# Patient Record
Sex: FEMALE | Race: WHITE | Employment: STUDENT | URBAN - NONMETROPOLITAN AREA
[De-identification: names, ages, dates, MRNs, and addresses within clinical notes are randomized per-mention and may not be internally consistent; named-entity substitution may affect disease eponyms.]

---
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
## 2023-03-29 ENCOUNTER — HOSPITAL ENCOUNTER (INPATIENT)
Age: 18
LOS: 3 days | Discharge: HOME | End: 2023-04-01
Attending: EMERGENCY MEDICINE | Admitting: PSYCHIATRY & NEUROLOGY
Payer: COMMERCIAL

## 2023-03-29 DIAGNOSIS — S50.812A ABRASION OF LEFT FOREARM, INITIAL ENCOUNTER: ICD-10-CM

## 2023-03-29 DIAGNOSIS — R45.851 DEPRESSION WITH SUICIDAL IDEATION: Primary | ICD-10-CM

## 2023-03-29 DIAGNOSIS — F32.A DEPRESSION WITH SUICIDAL IDEATION: Primary | ICD-10-CM

## 2023-03-29 DIAGNOSIS — T14.91XA SUICIDAL BEHAVIOR WITH ATTEMPTED SELF-INJURY (HCC): ICD-10-CM

## 2023-03-29 PROBLEM — F33.0 MDD (MAJOR DEPRESSIVE DISORDER), RECURRENT EPISODE, MILD (HCC): Status: ACTIVE | Noted: 2023-03-29

## 2023-03-29 LAB
ALBUMIN SERPL BCG-MCNC: 4.2 G/DL (ref 3.5–5.1)
ALP SERPL-CCNC: 71 U/L (ref 38–126)
ALT SERPL W/O P-5'-P-CCNC: 18 U/L (ref 11–66)
AMPHETAMINES UR QL SCN: NEGATIVE
ANION GAP SERPL CALC-SCNC: 13 MEQ/L (ref 8–16)
AST SERPL-CCNC: 19 U/L (ref 5–40)
B-HCG SERPL QL: NEGATIVE
BARBITURATES UR QL SCN: NEGATIVE
BASOPHILS ABSOLUTE: 0 THOU/MM3 (ref 0–0.1)
BASOPHILS NFR BLD AUTO: 0.3 %
BENZODIAZ UR QL SCN: NEGATIVE
BILIRUB SERPL-MCNC: < 0.2 MG/DL (ref 0.3–1.2)
BILIRUB UR QL STRIP.AUTO: NEGATIVE
BUN SERPL-MCNC: 9 MG/DL (ref 7–22)
BZE UR QL SCN: NEGATIVE
CALCIUM SERPL-MCNC: 9.5 MG/DL (ref 8.5–10.5)
CANNABINOIDS UR QL SCN: NEGATIVE
CHARACTER UR: CLEAR
CHLORIDE SERPL-SCNC: 103 MEQ/L (ref 98–111)
CO2 SERPL-SCNC: 22 MEQ/L (ref 23–33)
COLOR: YELLOW
CREAT SERPL-MCNC: 0.8 MG/DL (ref 0.4–1.2)
DEPRECATED RDW RBC AUTO: 40.4 FL (ref 35–45)
EOSINOPHIL NFR BLD AUTO: 0.4 %
EOSINOPHILS ABSOLUTE: 0 THOU/MM3 (ref 0–0.4)
ERYTHROCYTE [DISTWIDTH] IN BLOOD BY AUTOMATED COUNT: 12.9 % (ref 11.5–14.5)
FENTANYL: NEGATIVE
GFR SERPL CREATININE-BSD FRML MDRD: > 60 ML/MIN/1.73M2
GLUCOSE SERPL-MCNC: 89 MG/DL (ref 70–108)
GLUCOSE UR QL STRIP.AUTO: NEGATIVE MG/DL
HCT VFR BLD AUTO: 41.4 % (ref 37–47)
HGB BLD-MCNC: 13.2 GM/DL (ref 12–16)
HGB UR QL STRIP.AUTO: NEGATIVE
IMM GRANULOCYTES # BLD AUTO: 0.04 THOU/MM3 (ref 0–0.07)
IMM GRANULOCYTES NFR BLD AUTO: 0.3 %
KETONES UR QL STRIP.AUTO: NEGATIVE
LYMPHOCYTES ABSOLUTE: 2.5 THOU/MM3 (ref 1–4.8)
LYMPHOCYTES NFR BLD AUTO: 20.5 %
MCH RBC QN AUTO: 27.6 PG (ref 26–33)
MCHC RBC AUTO-ENTMCNC: 31.9 GM/DL (ref 32.2–35.5)
MCV RBC AUTO: 86.4 FL (ref 81–99)
MONOCYTES ABSOLUTE: 0.7 THOU/MM3 (ref 0.4–1.3)
MONOCYTES NFR BLD AUTO: 6 %
NEUTROPHILS NFR BLD AUTO: 72.5 %
NITRITE UR QL STRIP: NEGATIVE
NRBC BLD AUTO-RTO: 0 /100 WBC
OPIATES UR QL SCN: NEGATIVE
OSMOLALITY SERPL CALC.SUM OF ELEC: 273.8 MOSMOL/KG (ref 275–300)
OXYCODONE: NEGATIVE
PCP UR QL SCN: NEGATIVE
PH UR STRIP.AUTO: 6.5 [PH] (ref 5–9)
PLATELET # BLD AUTO: 360 THOU/MM3 (ref 130–400)
PMV BLD AUTO: 9.7 FL (ref 9.4–12.4)
POTASSIUM SERPL-SCNC: 4 MEQ/L (ref 3.5–5.2)
PROT SERPL-MCNC: 8.2 G/DL (ref 6.1–8)
PROT UR STRIP.AUTO-MCNC: NEGATIVE MG/DL
RBC # BLD AUTO: 4.79 MILL/MM3 (ref 4.2–5.4)
SEGMENTED NEUTROPHILS ABSOLUTE COUNT: 9 THOU/MM3 (ref 1.8–7.7)
SODIUM SERPL-SCNC: 138 MEQ/L (ref 135–145)
SP GR UR REFRACT.AUTO: 1.02 (ref 1–1.03)
UROBILINOGEN, URINE: 0.2 EU/DL (ref 0–1)
WBC # BLD AUTO: 12.4 THOU/MM3 (ref 4.8–10.8)
WBC #/AREA URNS HPF: NEGATIVE /[HPF]

## 2023-03-29 PROCEDURE — 90471 IMMUNIZATION ADMIN: CPT | Performed by: EMERGENCY MEDICINE

## 2023-03-29 PROCEDURE — 99285 EMERGENCY DEPT VISIT HI MDM: CPT

## 2023-03-29 PROCEDURE — 36415 COLL VENOUS BLD VENIPUNCTURE: CPT

## 2023-03-29 PROCEDURE — 81003 URINALYSIS AUTO W/O SCOPE: CPT

## 2023-03-29 PROCEDURE — 85025 COMPLETE CBC W/AUTO DIFF WBC: CPT

## 2023-03-29 PROCEDURE — 80307 DRUG TEST PRSMV CHEM ANLYZR: CPT

## 2023-03-29 PROCEDURE — 1240000000 HC EMOTIONAL WELLNESS R&B

## 2023-03-29 PROCEDURE — 6370000000 HC RX 637 (ALT 250 FOR IP): Performed by: PSYCHIATRY & NEUROLOGY

## 2023-03-29 PROCEDURE — 84703 CHORIONIC GONADOTROPIN ASSAY: CPT

## 2023-03-29 PROCEDURE — 80053 COMPREHEN METABOLIC PANEL: CPT

## 2023-03-29 PROCEDURE — 6360000002 HC RX W HCPCS: Performed by: EMERGENCY MEDICINE

## 2023-03-29 PROCEDURE — 90715 TDAP VACCINE 7 YRS/> IM: CPT | Performed by: EMERGENCY MEDICINE

## 2023-03-29 RX ORDER — IBUPROFEN 400 MG/1
400 TABLET ORAL EVERY 6 HOURS PRN
Status: DISCONTINUED | OUTPATIENT
Start: 2023-03-29 | End: 2023-04-01 | Stop reason: HOSPADM

## 2023-03-29 RX ORDER — HYDROXYZINE HYDROCHLORIDE 25 MG/1
50 TABLET, FILM COATED ORAL 3 TIMES DAILY PRN
Status: DISCONTINUED | OUTPATIENT
Start: 2023-03-29 | End: 2023-04-01 | Stop reason: HOSPADM

## 2023-03-29 RX ORDER — TRAZODONE HYDROCHLORIDE 50 MG/1
50 TABLET ORAL NIGHTLY PRN
Status: DISCONTINUED | OUTPATIENT
Start: 2023-03-29 | End: 2023-04-01 | Stop reason: HOSPADM

## 2023-03-29 RX ORDER — HYDROXYZINE HYDROCHLORIDE 25 MG/1
50 TABLET, FILM COATED ORAL 3 TIMES DAILY PRN
Status: DISCONTINUED | OUTPATIENT
Start: 2023-03-29 | End: 2023-03-29 | Stop reason: CLARIF

## 2023-03-29 RX ORDER — TRAZODONE HYDROCHLORIDE 50 MG/1
50 TABLET ORAL NIGHTLY PRN
Status: DISCONTINUED | OUTPATIENT
Start: 2023-03-29 | End: 2023-03-29 | Stop reason: CLARIF

## 2023-03-29 RX ORDER — ACETAMINOPHEN 325 MG/1
650 TABLET ORAL EVERY 4 HOURS PRN
Status: DISCONTINUED | OUTPATIENT
Start: 2023-03-29 | End: 2023-04-01 | Stop reason: HOSPADM

## 2023-03-29 RX ORDER — IBUPROFEN 200 MG
400 TABLET ORAL EVERY 6 HOURS PRN
Status: DISCONTINUED | OUTPATIENT
Start: 2023-03-29 | End: 2023-03-29 | Stop reason: CLARIF

## 2023-03-29 RX ORDER — MAGNESIUM HYDROXIDE/ALUMINUM HYDROXICE/SIMETHICONE 120; 1200; 1200 MG/30ML; MG/30ML; MG/30ML
30 SUSPENSION ORAL EVERY 6 HOURS PRN
Status: DISCONTINUED | OUTPATIENT
Start: 2023-03-29 | End: 2023-03-29 | Stop reason: CLARIF

## 2023-03-29 RX ORDER — MAGNESIUM HYDROXIDE/ALUMINUM HYDROXICE/SIMETHICONE 120; 1200; 1200 MG/30ML; MG/30ML; MG/30ML
30 SUSPENSION ORAL EVERY 6 HOURS PRN
Status: DISCONTINUED | OUTPATIENT
Start: 2023-03-29 | End: 2023-04-01 | Stop reason: HOSPADM

## 2023-03-29 RX ORDER — ACETAMINOPHEN 325 MG/1
650 TABLET ORAL EVERY 4 HOURS PRN
Status: DISCONTINUED | OUTPATIENT
Start: 2023-03-29 | End: 2023-03-29 | Stop reason: CLARIF

## 2023-03-29 RX ADMIN — ACETAMINOPHEN 650 MG: 325 TABLET ORAL at 17:42

## 2023-03-29 RX ADMIN — TETANUS TOXOID, REDUCED DIPHTHERIA TOXOID AND ACELLULAR PERTUSSIS VACCINE, ADSORBED 0.5 ML: 5; 2.5; 8; 8; 2.5 SUSPENSION INTRAMUSCULAR at 15:31

## 2023-03-29 RX ADMIN — HYDROXYZINE HYDROCHLORIDE 50 MG: 25 TABLET, FILM COATED ORAL at 17:42

## 2023-03-29 ASSESSMENT — PAIN - FUNCTIONAL ASSESSMENT
PAIN_FUNCTIONAL_ASSESSMENT: NONE - DENIES PAIN
PAIN_FUNCTIONAL_ASSESSMENT: ACTIVITIES ARE NOT PREVENTED

## 2023-03-29 ASSESSMENT — SLEEP AND FATIGUE QUESTIONNAIRES
DO YOU USE A SLEEP AID: NO
DO YOU HAVE DIFFICULTY SLEEPING: NO
AVERAGE NUMBER OF SLEEP HOURS: 8

## 2023-03-29 ASSESSMENT — LIFESTYLE VARIABLES
HOW MANY STANDARD DRINKS CONTAINING ALCOHOL DO YOU HAVE ON A TYPICAL DAY: PATIENT DOES NOT DRINK
HOW OFTEN DO YOU HAVE A DRINK CONTAINING ALCOHOL: NEVER

## 2023-03-29 ASSESSMENT — PATIENT HEALTH QUESTIONNAIRE - PHQ9: SUM OF ALL RESPONSES TO PHQ QUESTIONS 1-9: 2

## 2023-03-29 ASSESSMENT — PAIN SCALES - GENERAL
PAINLEVEL_OUTOF10: 3
PAINLEVEL_OUTOF10: 3
PAINLEVEL_OUTOF10: 0

## 2023-03-29 ASSESSMENT — PAIN DESCRIPTION - LOCATION
LOCATION: HEAD
LOCATION: HEAD

## 2023-03-29 ASSESSMENT — PAIN DESCRIPTION - DESCRIPTORS: DESCRIPTORS: ACHING

## 2023-03-29 NOTE — ED TRIAGE NOTES
Pt presents to the ED with c/o suicidal thoughts. Pt reports since Monday she has been having these thoughts. Pt reports she she had thoughts like this before but about 4 years ago. Pt reports everything in her life has started to build up and is stressing her out. Patient placed in safe room that is ligature resistant with continuous monitoring in place. Provider notified, requested an assessment by behavioral health . Patient belongings secured in a locked lockers outside of the room. Explained suicide prevention precautions to the patient including constant observer. Urine sample collected at this time. Level A paged.

## 2023-03-29 NOTE — ED PROVIDER NOTES
blank): No orders to display       LABS: (none if blank)  Labs Reviewed   CBC WITH AUTO DIFFERENTIAL - Abnormal; Notable for the following components:       Result Value    WBC 12.4 (*)     MCHC 31.9 (*)     Segs Absolute 9.0 (*)     All other components within normal limits   COMPREHENSIVE METABOLIC PANEL W/ REFLEX TO MG FOR LOW K - Abnormal; Notable for the following components:    CO2 22 (*)     Total Protein 8.2 (*)     Total Bilirubin <0.2 (*)     All other components within normal limits   OSMOLALITY - Abnormal; Notable for the following components:    Osmolality Calc 273.8 (*)     All other components within normal limits   HCG, SERUM, QUALITATIVE   URINALYSIS WITH REFLEX TO CULTURE   URINE DRUG SCREEN   ANION GAP   GLOMERULAR FILTRATION RATE, ESTIMATED       (Any cultures that may have been sent were not resulted at the time of this patient visit)    [facility] / ED COURSE:     1) Number and Complexity of Problems            Problem List This Visit:         Chief Complaint   Patient presents with    Suicidal            Differential Diagnosis includes (but not limited to): Major depression with suicidal attempt and thoughts,        Diagnoses Considered but I have low suspicion of:                Pertinent Comorbid Conditions:        2)  Data Reviewed (none if left blank)          My Independent interpretations:       Imaging: none    Labs: Within normal limits                 Decision Rules/Clinical Scores utilized:  None            External Documentation Reviewed:         Previous patient encounter documents & history available on EMR was reviewed none             See Formal Diagnostic Results above for the lab and radiology tests and orders.     3)  Treatment and Disposition:         ED Medications administered this visit:  (None if blank)         Medications   Tetanus-Diphth-Acell Pertussis (BOOSTRIX) injection 0.5 mL (has no administration in time range)            ED Reassessment: patient. PROCEDURES: (None if blank)  Procedures:     CRITICAL CARE:  None      FINAL IMPRESSION      1. Depression with suicidal ideation    2. Abrasion of left forearm and shoulder, initial encounter    3. Suicidal behavior with attempted self-injury [facility])          DISPOSITION/PLAN   DISPOSITION Admitted 03/29/2023 12:52:58 PM      PATIENT REFERRED TO: Patient's admitted to psychiatry services Dr.Rusheeth Landis, *graciously admitted the patient        (Please note that portions of this note were completed with a voice recognition program and electronically transcribed. Efforts were made to edit the dictations but occasionally words are mis-transcribed . The transcription may contain errors not detected in proofreading.   This transcription was electronically signed.)     03/29/23 2:10 PM      Nicole Lynn MD          Emergency room physician             Nicole Lynn MD  03/29/23 1938

## 2023-03-29 NOTE — ED NOTES
ED to inpatient nurses report    Chief Complaint   Patient presents with    Suicidal      Present to ED from home  LOC: alert and orientated to name, place, date  Vital signs   Vitals:    03/29/23 1141   BP: (!) 141/78   Pulse: (!) 104   Resp: 18   Temp: 98 °F (36.7 °C)   SpO2: 98%      Oxygen Baseline RA    Current needs required RA Bipap/Cpap No  LDAs:    Mobility: Independent  Pending ED orders: none  Present condition: Patient resting in bed. Respirations easy and unlabored. No distress noted. Call light within reach.       C-SSRS Risk of Suicide: High Risk  Swallow Screening    Preferred Language: [place]     Electronically signed by Halle Diaz RN on 3/29/2023 at 3:28 PM     Elma Gilbert RN  03/29/23 9458

## 2023-03-29 NOTE — PROGRESS NOTES
Nicolasa Aguiar, [facility] staff accompanying pt reports that she has spent time with pt this morning and the pt is in a much better state currently than she had been this morning. Urbano Ramirez reports she is concerned as the pt has shared she feels safe when with friends or while busy however if she has down time and is alone she is not able to contract for safety and is unsure of whether she would harm herself. Level of Care Disposition:      Consulted with Dr. Iris Foster. Pt is medically cleared. Consulted with Dr. Hoa Pedraza. Pt to be admitted to 4E. Medical staff updated on POC. Pt updated on POC. Voluntary form signed and placed in safe room chart 25. Report given to [facility] on 4E.

## 2023-03-29 NOTE — Clinical Note
Discharge Plan[de-identified] Home with Office Follow-up   [facility] request [facility]

## 2023-03-29 NOTE — PROGRESS NOTES
Behavioral Health   Admission Note   Admission Type: Involuntary    Reason for Admission: \"suicidal thoughts\"    Patient Strengths/Barriers  Strengths (Must Choose Two): Spirituality, Support from friends, Recreational/leisure/hobbies  Barriers: Motivation level for treatment    Addictive Behavior  In the Past 3 Months, Have You Felt or Has Someone Told You That You Have a Problem With  : None    Medical Problems:   History reviewed. No pertinent past medical history. Status EXAM:  Mental Status and Behavioral Exam  Normal: Yes  Level of Assistance: Independent/Self  Facial Expression: Flat, Sad, Worried  Affect: Blunt  Level of Consciousness: Alert  Frequency of Checks: 4 times per hour, close  Mood:Normal: No  Mood: Anxious, Sad  Motor Activity:Normal: Yes  Eye Contact: Good  Observed Behavior: Cooperative, Friendly  Sexual Misconduct History: Current - no  Preception: Dexter to person, Dexter to time, Dexter to place, Dexter to situation  Attention:Normal: Yes  Thought Processes: Unremarkable  Thought Content:Normal: Yes  Depression Symptoms: Appetite change, Change in energy level  Anxiety Symptoms: Generalized  [place]ill[place] Symptoms: No problems reported or observed. Hallucinations: None  Delusions: No  Memory:Normal: Yes  Insight and Judgment: No  Insight and Judgment: Poor judgment, Poor insight    Pt admitted with followings belongings:  Dental Appliances: None  Vision - Corrective Lenses: None  Hearing Aid: None  Jewelry: Necklace  Body Piercings Removed: N/A  Clothing: Footwear, Jacket/Coat, Pants, Shirt, Socks, Sweater, Undergarments, Other (Comment) (Locker)  Other Valuables: Money, Wallet, Keys, Other (Comment) ($3.00 cash all in locker.)     Admission order obtained Yes  Belongings locked on unit. Valuables placed in locked in belongings. Patient's home medications were n/a. Patient oriented to surroundings and program expectations and copy of patient rights given. Received admission packet:   Yes Consents reviewed, signed Yes. Outcomes Questionnaire completed Yes. \"An Important Message from [employer] About Your Rights\" form reviewed, signed: N/A . Patient verbalize understanding: Yes. Patient informed of 15 minute safety monitoring: YES/NO/NA: yes          Patient screened positive for suicide risk on CSSR-S (\"yes\" to question #4, 5, OR 6)  yes. Physician notified of risk score yes  Constant Observer Orders received no . 2 person skin assessment completed upon admission declined. Patient has multiple self inflicted cuts on her left wrist and left shoulder. Explained patients right to have family, representative or physician notified of their admission. Patient has Declined for physician to be notified. Patient has Declined for family/representative to be notified. Provided pt with [employer] handout entitled \"Quitting Smoking. \"  Reviewed handout with pt addressing dangers of smoking, developing coping skills, and providing basic information about quitting. Pt response to counseling:  does not smoke    Admission summary:   Patient having increased depression and anxiety. Reports that she is very anxious d/t not doing well on 2 exams and finding out that her school tuition is increasing by $4,000 per semester. Patient cut herself on her left wrist and left shoulder.           Raman Walker RN

## 2023-03-30 PROBLEM — F32.A DEPRESSION WITH SUICIDAL IDEATION: Status: ACTIVE | Noted: 2023-03-29

## 2023-03-30 PROBLEM — R45.851 DEPRESSION WITH SUICIDAL IDEATION: Status: ACTIVE | Noted: 2023-03-29

## 2023-03-30 PROCEDURE — APPSS30 APP SPLIT SHARED TIME 16-30 MINUTES: Performed by: PHYSICIAN ASSISTANT

## 2023-03-30 PROCEDURE — 1240000000 HC EMOTIONAL WELLNESS R&B

## 2023-03-30 RX ORDER — SERTRALINE HYDROCHLORIDE 25 MG/1
25 TABLET, FILM COATED ORAL DAILY
Status: DISCONTINUED | OUTPATIENT
Start: 2023-03-30 | End: 2023-04-01 | Stop reason: HOSPADM

## 2023-03-30 RX ORDER — NORETHINDRONE ACETATE AND ETHINYL ESTRADIOL 1.5; .03 MG/1; MG/1
1 TABLET ORAL DAILY
COMMUNITY
Start: 2023-03-09

## 2023-03-30 RX ORDER — NORETHINDRONE ACETATE AND ETHINYL ESTRADIOL .03; 1.5 MG/1; MG/1
1 TABLET ORAL DAILY
Status: DISCONTINUED | OUTPATIENT
Start: 2023-03-30 | End: 2023-04-01 | Stop reason: HOSPADM

## 2023-03-30 RX ADMIN — NORETHINDRONE ACETATE AND ETHINYL ESTRADIOL 1 TABLET: .03; 1.5 TABLET ORAL at 08:38

## 2023-03-30 ASSESSMENT — PAIN SCALES - GENERAL
PAINLEVEL_OUTOF10: 0
PAINLEVEL_OUTOF10: 0

## 2023-03-30 NOTE — BH NOTE
INPATIENT RECREATIONAL THERAPY  ADULT BEHAVIORAL SERVICES  EVALUATION    REFERRING PHYSICIAN:    Dr. Liza Stark  DIAGNOSIS:    Major Depressive Disorder, Recurrent Episode, Mild  PRECAUTIONS:   Standard precautions    HISTORY OF PRESENT ILLNESS/INJURY:   Patient was admitted to the unit due to suicidal ideation and depression. Patient reported having thoughts of wrecking her car prior to admission. Patient has also been cutting her left wrist. Patient reported stress due to school and stated that she recently got some poor scores on her exams. Patient also reported that her tuition has gone up and she feels worried about her financial situation. Patient was cooperative and pleasant with a bright affect. 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 LIVING SITUATION/SUPPORT:   Lives on campus at [employer]. Family and friends are supportive. EDUCATIONAL LEVEL:  Patient is currently a college student. MEDICATION/DRUG USE:  None reported    LEISURE INTERESTS: drawing, coloring, arts/crafts, playing games and cards, activities with her friends, watching [employer], shot put in track and field, jogging, spiritual activities, listening to music, line dancing, cooking, walking, outdoor activities, family activities  ACTIVITY PREFERENCE:   no preference  ACTIVITY TYPES:   Passive. Indoor. Outdoor. Active.    COGNITION:   A&Ox4    COPING:   poor  ATTENTION:   fair  RELAXATION:  fair  SELF-ESTEEM:  poor  MOTIVATION:   poor    SOCIAL SKILLS:   good  FRUSTRATION TOLERANCE:  poor - cuts on left wrist.   ATTENTION SEEKING:  Patient has been cutting her left wrist.  COOPERATION: cooperative and pleasant  AFFECT:   bright  APPEARANCE:   appropriate    HEARING:   no problems noted  VISION:   no problems noted   VERBAL COMMUNICATION:   no problems  WRITTEN COMMUNICATION:  no problems       MOBILITY:   Ambulates independently   GOALS:  Identify 2 new positive coping skills by time of discharge.

## 2023-03-30 NOTE — BH NOTE
PLAN OF CARE:     Start Time: 0900  End Time:  0930    Group Topic:  Daily Goals    Group Type:   Goal Group    Intervention/Goal:  To increase support and identify daily goals    Attendance:  attended group      Affect:  bright    Behavior:  cooperative and pleasant    Response:  appropriate    Daily Goal:  \"Be positive. Think clearly. Create coping skills for stress management. \"    Progress:  progressing to goal

## 2023-03-30 NOTE — PLAN OF CARE
Problem: Self Harm/Suicidality  Goal: Will have no self-injury during hospital stay  Description: INTERVENTIONS:  1. Ensure constant observer at bedside with Q15M safety checks  2. Maintain a safe environment  3. Secure patient belongings  4. Ensure family/visitors adhere to safety recommendations  5. Ensure safety tray has been added to patient's diet order  6. Every shift and PRN: Re-assess suicidal risk via Frequent Screener    3/30/2023 1033 by Frankey Beat, LPN  Outcome: Progressing  Note: No self injury during hospital stay , will continue to monitor  3/29/2023 2216 by Maria Elena Monroy LPN  Outcome: Progressing  Note: No self injury noted during this shift      Problem: Pain  Goal: Verbalizes/displays adequate comfort level or baseline comfort level  3/30/2023 1033 by Frankey Beat, LPN  Outcome: Progressing  Note: Patient denies pain at this time, will continue to monitor  3/29/2023 2216 by Maria Elena Monroy LPN  Outcome: Progressing  Note: Patient denies pain     Problem: Risk for Elopement  Goal: Patient will not exit the unit/facility without proper excort  3/30/2023 1033 by Frankey Beat, LPN  Outcome: Progressing  Note: Patient did not attempt to exit the unit without proper excort  3/29/2023 2216 by Maria Elena Monroy LPN  Outcome: Progressing  Flowsheets  Taken 3/29/2023 2212 by Maria Elena Monroy LPN  Nursing Interventions for Elopement Risk: Make sure patient has all necessary personal care items  Taken 3/29/2023 1625 by Libia Swan RN  Nursing Interventions for Elopement Risk: Make sure patient has all necessary personal care items  Note: Patient did not try to exit the unit during this shift      Problem: Anxiety  Goal: Will report anxiety at manageable levels  Description: INTERVENTIONS:  1. Administer medication as ordered  2. Teach and rehearse alternative coping skills  3.  Provide emotional support with 1:1 interaction with staff  3/30/2023 1033 by Frankey Beat, LPN  Outcome: Progressing  Note: Patient denies anxiety at this time, will monitor  3/29/2023 2216 by Pradeep Jarrett LPN  Outcome: Not Progressing  Note: Mood 6/10 with high anxiety and medium depression     Problem: Coping  Goal: Pt/Family able to verbalize concerns and demonstrate effective coping strategies  Description: INTERVENTIONS:  1. Assist patient/family to identify coping skills, available support systems and cultural and spiritual values  2. Provide emotional support, including active listening and acknowledgement of concerns of patient and caregivers  3. Reduce environmental stimuli, as able  4. Instruct patient/family in relaxation techniques, as appropriate  5. Assess for spiritual pain/suffering and initiate Spiritual Care, Psychosocial Clinical Specialist consults as needed  3/30/2023 1033 by Poppy York LPN  Outcome: Progressing  Note: Patient did not verbalize any concerns and is attending groups to learn effective coping strategies  3/29/2023 2216 by Pradeep Jarrett LPN  Outcome: Progressing  Note: Patient coped during this shift by watching tv and socializing with peers     Problem: Involuntary Admit  Goal: Will cooperate with staff recommendations and doctor's orders and will demonstrate appropriate behavior  Description: INTERVENTIONS:  1. Treat underlying conditions and offer medication as ordered  2. Educate regarding involuntary admission procedures and rules  3. Contain excessive/inappropriate behavior per unit and hospital policies  [id number] by Ppopy York LPN  Outcome: Progressing  Note: Patient is cooperative with staff and doctors orders and does demonstrate effective coping strategies  3/29/2023 2216 by Pradeep Jarrett LPN  Outcome: Progressing  Note: Cooperates with staff and appropriately follows doctors orders      Problem: Depression  Goal: Will be euthymic at discharge  Description: INTERVENTIONS:  1. Administer medication as ordered  2.  Provide emotional support via 1:1

## 2023-03-30 NOTE — BH NOTE
Head, normocephalic, atraumatic, Face, Face within normal limits, Ears, External ears within normal limits, Nose/Nasopharynx, External nose  normal appearance, nares patent, no nasal discharge, Mouth and Throat, Oral cavity appearance normal, Breath odor normal, Lips, Appearance normal Patient did attend group.  Her goal was to find strategies to cope

## 2023-03-30 NOTE — PROGRESS NOTES
This RN has reviewed and agrees with Russel Gusman LPN's data collection and has collaborated with this LPN regarding the patient's care plan.

## 2023-03-30 NOTE — PLAN OF CARE
Patient has attended all of the groups and has been out of her room this shift to color, visit with her parents, watch TV and socialize with others so she has been able to demonstrate effective coping strategies today.

## 2023-03-30 NOTE — PROGRESS NOTES
[facility]  Initial Interdisciplinary Treatment Plan NOTE    REVIEW DATE AND TIME: 3/30/23 1408    PATIENT was IN TREATMENT TEAM.  See Multidisciplinary Treatment Team sheet for participants. ADMISSION TYPE:   Admission Type: Involuntary    REASON FOR ADMISSION:  Reason for Admission: \"suicidal thoughts\"      Estimated Length of Stay Update:  2-3 days  Estimated Discharge Date Update: 4/1/23    Patient Strengths/Barriers  Strengths (Must Choose Two): Spirituality, Support from friends, Recreational/leisure/hobbies  Barriers: Motivation level for treatment  Addictive Behavior:Addictive Behavior  In the Past 3 Months, Have You Felt or Has Someone Told You That You Have a Problem With  : None  Medical Problems:History reviewed. No pertinent past medical history. EDUCATION:   Learner Progress Toward Treatment Goals: Reviewed results and recommendations of this team, Reviewed group plan and strategies, Reviewed signs, symptoms and risk of self harm and violent behavior, and Reviewed goals and plan of care    Method: Individual    Outcome: Verbalized understanding and Demonstrated Understanding    PATIENT GOALS: To improve coping skills. OQ TOP QUALITY PRIORITIES FOR THE PATIENT AS IDENTIFIED ON ADMISSION ADMINISTRATION:        29- No Priorities    PLAN/TREATMENT RECOMMENDATIONS UPDATE:   What is the most important thing we can help you with while you are here? See above  Who is your support system? Family and friends  Do you have follow-up providers? No. Patient is not prescribed psych medications. Patient is to follow up with ONU counseling services with [facility] or [address]. Do you have the ability to pay for your medications? Yes, BCBS. Where will you be residing when you leave the hospital? At apartment on [address] [place] [address] with roommate. Will need a return to work slip or FMLA paper completion?  Yes      GOALS UPDATE:   Time frame for Short-Term Goals: Daily    LUCITA Bain

## 2023-03-30 NOTE — PROGRESS NOTES
Psychosocial Assessment    Current Level of Psychosocial Functioning     Independent   XXX  Dependent    Minimal Assist     Comments:      Psychosocial High Risk Factors (check all that apply)    Unable to obtain meds   Chronic illness/pain    Substance abuse   Lack of Family Support   Financial stress    XXX  Isolation   Inadequate Community Resources  Suicide attempt(s)  Not taking medications   XXX  Victim of crime   Developmental Delay  Unable to manage personal needs    Age 72 or older   Homeless  No transportation   Readmission within 30 days  Unemployment  Traumatic Event    Family/Supports identified: Parents, two siblings, and friends. Sexual Orientation:  Heterosexual    Patient Strengths: Patient has stable housing. Patient was seeking counseling which led to her admission. Patient has strong support system of family and friends. Patient Barriers: Patient does not take any psychiatric medications. Patient has busy schedule being a college student, working 2 jobs, and being on the track team.    Safety plan: Contracts for safety. CMHC/MH history: Patient states history of depression since sophomore year of high school. Age 13, patient wrote a suicide note but did not attempt. Plan of Care:  medication management, group/individual therapies, family meetings, psycho -education, treatment team meetings to assist with stabilization    Initial Discharge Plan:  Patient is to follow up with [facility] counseling services with [facility]. Clinical Summary:  Patient is a 19year old female that was admitted to the unit from the ED. Patient states current stressors such as an tuition increase and 2 tests led her to reaching out to counseling services on her college campus, [employer]. Patient states the stressors occurred on Monday but she felt okay due to hanging out with her friends.  Patient states that she is not alone often but

## 2023-03-30 NOTE — GROUP NOTE
Group Therapy Note    Date: 3/30/2023    Group Start Time: 3999  Group End Time: 1200  Group Topic: Healthy Living/Wellness    [facility] Adult Psych 4E    Ernestina Piedra LPN        Group Therapy Note    Attendees: 9       Notes:  did not attend    Discipline Responsible: Licensed Practical Nurse      Signature:  Ernestina Piedra LPN

## 2023-03-30 NOTE — H&P
brisk bilaterally  Lungs: Clear to auscultation bilaterally without wheezing or rales. Musculoskeletal:  Full range of motion in all four extremities. Neurologic:  Cranial nerves II through XII are grossly intact. Normal gait and station. Skin: Patient has multiple linear superficial lacerations on her left shoulder and wrist    Mental Status Examination:    Level of consciousness:  awake  Appearance:  well-appearing, hospital attire, in chair, good grooming, and good hygiene  Behavior/Motor:  no abnormalities noted  Attitude toward examiner:  cooperative, attentive, and good eye contact  Speech:  spontaneous, normal rate, and normal volume  Mood:  great per patient  Affect: Reactive  Thought processes:  linear, goal directed, and coherent  Thought content:  Denies homicidal ideation  Suicidal Ideation:  denies suicidal ideation  Delusions:  no evidence of delusions  Perceptual Disturbance:  denies any perceptual disturbance  Cognition: Patient is oriented to person, place, time and situation  Concentration: clinically adequate  Memory: intact  Insight & Judgement: fair         DSM-5 DIAGNOSIS:    Depression with suicidal ideation    Patient Active Problem List   Diagnosis    Depression with suicidal ideation          Psychosocial and Contextual Factors:   Financial  Occupational  Educational     History reviewed. No pertinent past medical history. Goals:    Reviewed labs  Reviewed EKG  Will obtain records and review them today. Medication adjustment as discussed with the attending physician: Will offer patient Zoloft 25 mg daily. Patient is reluctant to take medication and would like to treat her symptoms with outpatient counseling only   Consults: None  Side effects and risks versus benefits of medications were discussed with the patient   Encouraged patient to engage in groups, milieu, and individual therapies offered as part of programing.      Behavioral Services  Medicare Certification Upon today.  Medication adjustment: Will offer Zoloft  Consults: none    Risk level: High     Behavioral Services  Medicare Certification     Admission Day 1  I certify that this patient's inpatient psychiatric hospital admission is medically necessary for:    x (1) treatment which could reasonably be expected to improve this patient's condition, or    x (2) diagnostic study or its equivalent. --Miriam Knott MD on 3/30/2023 at 9:02 PM    An electronic signature was used to authenticate this note. **This report has been created using voice recognition software. It may contain minor errors which are inherent in voice recognition technology. **

## 2023-03-30 NOTE — PLAN OF CARE
Problem: Anxiety  Goal: Will report anxiety at manageable levels  Description: INTERVENTIONS:  1. Administer medication as ordered  2. Teach and rehearse alternative coping skills  3. Provide emotional support with 1:1 interaction with staff  Outcome: Not Progressing  Note: Mood 6/10 with high anxiety and medium depression     Problem: Depression  Goal: Will be euthymic at discharge  Description: INTERVENTIONS:  1. Administer medication as ordered  2. Provide emotional support via 1:1 interaction with staff  3. Encourage involvement in milieu/groups/activities  4. Monitor for social isolation  Outcome: Not Progressing  Note: Mood 6/10 with high anxiety and medium depression     Problem: Self Harm/Suicidality  Goal: Will have no self-injury during hospital stay  Description: INTERVENTIONS:  1. Ensure constant observer at bedside with Q15M safety checks  2. Maintain a safe environment  3. Secure patient belongings  4. Ensure family/visitors adhere to safety recommendations  5. Ensure safety tray has been added to patient's diet order  6.   Every shift and PRN: Re-assess suicidal risk via Frequent Screener    Outcome: Progressing  Note: No self injury noted during this shift      Problem: Pain  Goal: Verbalizes/displays adequate comfort level or baseline comfort level  Outcome: Progressing  Note: Patient denies pain     Problem: Risk for Elopement  Goal: Patient will not exit the unit/facility without proper excort  Outcome: Progressing  Flowsheets  Taken 3/29/2023 2212 by Darinel Kovacs LPN  Nursing Interventions for Elopement Risk: Make sure patient has all necessary personal care items  Taken 3/29/2023 1625 by Fatmata Keys, MUKUL  Nursing Interventions for Elopement Risk: Make sure patient has all necessary personal care items  Note: Patient did not try to exit the unit during this shift      Problem: Coping  Goal: Pt/Family able to verbalize concerns and demonstrate effective coping

## 2023-03-30 NOTE — PROGRESS NOTES
Behavioral Services  Medicare Certification Upon Admission    I certify that this patient's inpatient psychiatric hospital admission is medically necessary for:    [x] (1) Treatment which could reasonably be expected to improve this patient's condition,       [x] (2) Or for diagnostic study;     AND     [x](2) The inpatient psychiatric services are provided while the individual is under the care of a physician and are included in the individualized plan of care.     Estimated length of stay/service 3-5 days    Plan for post-hospital care hc    Electronically signed by Emmanuel Phillips MD on 3/30/2023 at 10:52 AM

## 2023-03-30 NOTE — BH NOTE
This RN has reviewed and agrees with Phong Collins LPN's data collection and has collaborated with this LPN regarding the patient's care plan.

## 2023-03-30 NOTE — BH NOTE
Group Therapy Note    Date: 3/30/2023    Start Time: 1000  End Time:  1030  Number of Participants: 8    Type of Group: Recreational    Patient's Goal:  Increase knowledge of positive coping skills. Notes:  Patient participated in a coping skills activity and shared her answers with others in the group. Patient was able to identify multiple positive coping skills.      Status After Intervention:  Improved    Participation Level: Interactive    Participation Quality: Appropriate, Attentive, and Sharing      Speech:  normal      Thought Process/Content: Logical      Affective Functioning: Congruent      Mood: euthymic      Level of consciousness:  Oriented x4      Response to Learning: Progressing to goal      Endings: None Reported    Modes of Intervention: Education, Support, Socialization, Exploration, and Activity      Discipline Responsible: Psychoeducational Specialist      Signature:  Zac Teran

## 2023-03-31 PROCEDURE — APPSS30 APP SPLIT SHARED TIME 16-30 MINUTES: Performed by: PHYSICIAN ASSISTANT

## 2023-03-31 PROCEDURE — 1240000000 HC EMOTIONAL WELLNESS R&B

## 2023-03-31 RX ADMIN — NORETHINDRONE ACETATE AND ETHINYL ESTRADIOL 1 TABLET: .03; 1.5 TABLET ORAL at 07:46

## 2023-03-31 ASSESSMENT — PAIN SCALES - GENERAL
PAINLEVEL_OUTOF10: 0
PAINLEVEL_OUTOF10: 0

## 2023-03-31 NOTE — PROGRESS NOTES
[address] [place] [address]  Day 3 Interdisciplinary Treatment Plan NOTE    Review Date & Time: 03/31/23  0900    Patient was in treatment team    Admission Type:   Admission Type: Involuntary    Reason for admission:  Reason for Admission: \"suicidal thoughts\"  Estimated Length of Stay Update:  04//03/23  Estimated Discharge Date Update: 04/01/23    Patient Strengths/Barriers  Strengths (Must Choose Two): Spirituality, Support from friends, Recreational/leisure/hobbies  Barriers: Motivation level for treatment  Addictive Behavior:Addictive Behavior  In the Past 3 Months, Have You Felt or Has Someone Told You That You Have a Problem With  : None  Medical Problems:History reviewed. No pertinent past medical history. Risk:  Fall Risk   Michael Scale Michael Scale Score: 22    Status EXAM:   Mental Status and Behavioral Exam  Normal: Yes  Level of Assistance: Independent/Self  Facial Expression: Brightened, Flat  Affect: Appropriate  Level of Consciousness: Alert  Frequency of Checks: 4 times per hour, close  Mood:Normal: Yes  Mood: Anxious, Sad  Motor Activity:Normal: Yes  Eye Contact: Good  Observed Behavior: Cooperative, Friendly  Sexual Misconduct History: Current - no  Preception: Graham to person, Graham to time, Graham to place, Graham to situation  Attention:Normal: Yes  Thought Processes: Unremarkable  Thought Content:Normal: Yes  Depression Symptoms: No problems reported or observed. Anxiety Symptoms: No problems reported or observed. Lilliana Symptoms: No problems reported or observed.   Hallucinations: None  Delusions: No  Memory:Normal: Yes  Insight and Judgment: No  Insight and Judgment: Poor insight    Daily Assessment Last Entry:   Daily Sleep (WDL): Within Defined Limits            Daily Nutrition (WDL): Within Defined Limits  Level of Assistance: Independent/Self    Patient Monitoring:  Frequency of Checks: 4 times per hour, close    Psychiatric Symptoms:   Depression Symptoms  Depression Symptoms: No problems reported or observed. Anxiety Symptoms  Anxiety Symptoms: No problems reported or observed. Lilliana Symptoms  Lilliana Symptoms: No problems reported or observed. Suicide Risk CSSR-S:  1) Within the past month, have you wished you were dead or wished you could go to sleep and not wake up? : Yes  2) Have you actually had any thoughts of killing yourself? : Yes  3) Have you been thinking about how you might kill yourself? : Yes  5) Have you started to work out or worked out the details of how to kill yourself? Do you intend to carry out this plan? : No  6) Have you ever done anything, started to do anything, or prepared to do anything to end your life?: Yes (wrote suicide note in high school)    EDUCATION:   Learner Progress Toward Treatment Goals: Reviewed results and recommendations of this team, Reviewed group plan and strategies, Reviewed signs, symptoms and risk of self harm and violent behavior, and Reviewed goals and plan of care    Method: Individual    Outcome: Verbalized understanding and Demonstrated Understanding    PATIENT GOALS: To improve on coping skills. OQ TOP QUALITY PRIORITIES FOR THE PATIENT AS IDENTIFIED ON ADMISSION ADMINISTRATION:        No priorities      PLAN/TREATMENT RECOMMENDATIONS UPDATE:  How are you progressing toward meeting your main treatment goal? Pt voiced feeling better and is ready for discharge. Pt describes past therapy, which has been helpful, however pt discontinues services when she is feeling better. Pt discussed the need to be consistent with her care. 2.  Are there discharge barriers/lingering problems that need to be addressed? None. Pt would like to follow up with the [address] [place] [address] providers. She states that she has a friend who also receives services through this department. 3.  Do you have the ability to pay for your medications? Yes BCBS      4. How is your group participation? Attending.      GOALS UPDATE:   Time frame for Short-Term

## 2023-03-31 NOTE — PROGRESS NOTES
Department of Psychiatry  Progress Note     Chief Complaint:  suicidal ideation    PROGRESS:  Riddhi Lopez presents to the interview room. She reports she is doing good today. Reports her day was good yesterday. Mood is fine. She rates her mood 8-9 out of 10 with 10 being the best.  Depression is fine. She states she is anxious to be discharged. She says \"I am antsy to get out of here. \"  She denies any active suicidal ideation at this time. Denies any urges to cut herself. She is able contract for safety on the unit. Her lacerations appear to be healing well. No signs of infection noted. She is feeling less hopeless and helpless about her situation. She has been working on coping skills on the unit she can use when she starts feeling depressed or suicidal at school. She reports she has been sleeping pretty good on the unit. Staff reported she slept 8.5 hours continuous last night. She has been refusing the Zoloft. She is not interested in psychiatric medication and wants to manage her depression with counseling. She has been on the unit and interacting well with peers and attending groups. She has been talking to her parents on the phone. Denies any visits. Patient is requesting to have access to her school materials to study for her upcoming anatomy exam.  Will allow her to have supervised access to her personal laptop to study at staff's discretion.     Suicidal ideations: denies active suicide ideation  Compliance with medications: Poor-refusing Zoloft  Medication side effects: absent  ROS: Patient has new complaints:  no  Sleep quality: 8.5 hours continuous last night per staff  Attending groups: yes      OBJECTIVE      Medications  Current Facility-Administered Medications: Norethindrone Acet-Ethinyl Est 1.5-30 MG-MCG TABS 1 tablet (Patient Supplied), 1 tablet, Oral, Daily  sertraline (ZOLOFT) tablet 25 mg, 25 mg, Oral, Daily  acetaminophen (TYLENOL) tablet 650 mg, 650 mg, Oral, Q4H

## 2023-03-31 NOTE — PROGRESS NOTES
Discharge planning-[facility] is scheduled for a follow up appointment with Jonathon Tucker on Wednesday, 04/05/23 at 1:45 pm at [facility].

## 2023-03-31 NOTE — PLAN OF CARE
Patient has attended all of the groups today and has also been out of her room to socialize with others this shift so she has been able to demonstrate effective coping strategies at this time.

## 2023-03-31 NOTE — PLAN OF CARE
orders and does demonstrate appropriate behaviors  3/30/2023 2019 by Mohsen Oliver LPN  Outcome: Progressing  Note: Patient cooperative with staff and peers. Appropriately follows doctors orders      Problem: Depression  Goal: Will be euthymic at discharge  Description: INTERVENTIONS:  1. Administer medication as ordered  2. Provide emotional support via 1:1 interaction with staff  3. Encourage involvement in milieu/groups/activities  4. Monitor for social isolation  3/31/2023 0849 by Allan Boyer LPN  Outcome: Progressing  Note: Patient denies depressive symptoms, rates mood #8-9, has bright affect, will continue to monitor  3/30/2023 2019 by Mohsen Oliver LPN  Outcome: Progressing  Note: Mood 8/10 with low anxiety and low depression     Problem: Skin/Tissue Integrity - Adult  Goal: Incisions, wounds, or drain sites healing without S/S of infection  3/31/2023 0849 by Allan Boyer LPN  Outcome: Progressing  Note: No signs or symptoms of infection  3/30/2023 2019 by Mohsen Oliver LPN  Outcome: Progressing  Note: Lacerations on left, inner arm and left shoulder. No drainage or redness. Patient denies pain     Care plan reviewed with patient . Patient  verbalizes understanding of the plan of care and contributes to goal setting.

## 2023-03-31 NOTE — BH NOTE
Group Therapy Note    Date: 3/31/2023  Start Time:  1000  End Time:   7973  Number of Participants: 7    Type of Group: Recreational      Patient's Goal:  Increase socialization and concentration. Notes:  Patient participated in Apples to Apples and was social with others. Patient demonstrate good concentration.      Status After Intervention:  Improved    Participation Level: Interactive    Participation Quality: Appropriate, Attentive, and Sharing      Speech:  normal      Thought Process/Content: Logical      Affective Functioning: Congruent      Mood: euthymic      Level of consciousness:  Oriented x4      Response to Learning: Progressing to goal      Endings: None Reported    Modes of Intervention: Socialization and Activity      Discipline Responsible: Psychoeducational Specialist      Signature:  Jesse Patrick

## 2023-03-31 NOTE — BH NOTE
PLAN OF CARE:     Start Time: 0900  End Time:  0925    Group Topic:  Daily Goals    Group Type:   Goal Group    Intervention/Goal:  To increase support and identify daily goals    Attendance:  attended group      Affect:   brightens with interaction    Behavior:  pleasant and cooperative     Response:  Identified a daily goal     Daily Goal:  \"To stay positive. \"    Progress:    progressing to goal

## 2023-03-31 NOTE — PROGRESS NOTES
Group Therapy Note    Date: 3/31/2023  Start Time: 1330  End Time:  5313  Number of Participants: 5    Type of Group: Psychotherapy    Notes:  Pt is present for group. The focus of the group discussion was related to hope as peers identified what it is that has helped them to feel hopeful since being admitted to this unit. Status After Intervention:  Improved    Participation Level:  Active Listener and Interactive    Participation Quality: Appropriate, Attentive, Sharing, and Supportive      Speech:  normal      Thought Process/Content: Logical  Linear      Affective Functioning: Congruent      Mood: euthymic        Level of consciousness:  Alert, Oriented x4, and Attentive      Response to Learning: Able to verbalize current knowledge/experience, Able to verbalize/acknowledge new learning, Able to retain information, Capable of insight, Able to change behavior, and Progressing to goal      Endings: None Reported    Modes of Intervention: Education, Support, Socialization, Exploration, Clarifying, and Problem-solving      Discipline Responsible: /Counselor      Signature:  UCHE Rocha

## 2023-03-31 NOTE — PROGRESS NOTES
Pt had a question during his MWV - he states it has been recommended to him to get 150 minutes of activity per week. He states according to his Fitbit he gets around 180 minutes per week on a \"lazy\" week. He states it doesn't take much to get his heart rate up, he states it goes over 100 walking to the  and back. He would like to know what he should consider \"activity\", or how should he measure how much activity he is getting?    He states okay to respond via [facility].    This RN has reviewed and agrees with [facility], LPN's data collection and has collaborated with this LPN regarding the patient's care plan.

## 2023-03-31 NOTE — PLAN OF CARE
Problem: Self Harm/Suicidality  Goal: Will have no self-injury during hospital stay  Description: INTERVENTIONS:  1. Ensure constant observer at bedside with Q15M safety checks  2. Maintain a safe environment  3. Secure patient belongings  4. Ensure family/visitors adhere to safety recommendations  5. Ensure safety tray has been added to patient's diet order  6. Every shift and PRN: Re-assess suicidal risk via Frequent Screener    3/30/2023 2019 by Chris Roca LPN  Outcome: Progressing  Note: No self injuries observed during this shift   3/30/2023 1033 by Lashawn Chase LPN  Outcome: Progressing  Note: No self injury during hospital stay , will continue to monitor     Problem: Pain  Goal: Verbalizes/displays adequate comfort level or baseline comfort level  3/30/2023 2019 by Chris Roca LPN  Outcome: Progressing  Flowsheets (Taken 3/30/2023 2019)  Verbalizes/displays adequate comfort level or baseline comfort level: Assess pain using appropriate pain scale  Note: Patient denies pain  3/30/2023 1033 by Lashawn Chase LPN  Outcome: Progressing  Note: Patient denies pain at this time, will continue to monitor     Problem: Risk for Elopement  Goal: Patient will not exit the unit/facility without proper excort  3/30/2023 2019 by Chris Roca LPN  Outcome: Progressing  [address] (Taken 3/30/2023 2008)  Nursing Interventions for Elopement Risk: Make sure patient has all necessary personal care items  Note: Patient did not try to exit the unit during this shift  3/30/2023 1033 by Lashawn Chase LPN  Outcome: Progressing  Note: Patient did not attempt to exit the unit without proper excort     Problem: Anxiety  Goal: Will report anxiety at manageable levels  Description: INTERVENTIONS:  1. Administer medication as ordered  2. Teach and rehearse alternative coping skills  3.  Provide emotional support with 1:1 interaction with staff  3/30/2023 2019 by Chris Roca LPN  Outcome: Areli Borja LPN  Outcome: Progressing  Note: Patient cooperative with staff and peers. Appropriately follows doctors orders   3/30/2023 1033 by Benji Pelletier LPN  Outcome: Progressing  Note: Patient is cooperative with staff and doctors orders and does demonstrate effective coping strategies     Problem: Depression  Goal: Will be euthymic at discharge  Description: INTERVENTIONS:  1. Administer medication as ordered  2. Provide emotional support via 1:1 interaction with staff  3. Encourage involvement in milieu/groups/activities  4. Monitor for social isolation  3/30/2023 2019 by Areli Borja LPN  Outcome: Progressing  Note: Mood 8/10 with low anxiety and low depression  3/30/2023 1033 by Benji Pelletier LPN  Outcome: Progressing  Note: Patient denies depressive symptoms, rates her rayshawn #7, has bright affect, will continue to monitor     Problem: Skin/Tissue Integrity - Adult  Goal: Incisions, wounds, or drain sites healing without S/S of infection  3/30/2023 2019 by Areli Borja LPN  Outcome: Progressing  Note: Lacerations on left, inner arm and left shoulder. No drainage or redness. Patient denies pain  3/30/2023 1033 by Benji Pelletier LPN  Outcome: Progressing  Note: No signs or symptoms of infection, will monitor   Care plan reviewed with patient.   Patient does verbalize understanding of the plan of care and does contribute to goal setting

## 2023-03-31 NOTE — PROGRESS NOTES
Case management 1001-Telephoned [facility] counseling department. Requested a follow up appointment and shared that pt will be discharged tomorrow.

## 2023-04-01 VITALS
OXYGEN SATURATION: 99 % | HEIGHT: 68 IN | BODY MASS INDEX: 29.25 KG/M2 | HEART RATE: 76 BPM | TEMPERATURE: 98.9 F | RESPIRATION RATE: 16 BRPM | WEIGHT: 193 LBS | SYSTOLIC BLOOD PRESSURE: 137 MMHG | DIASTOLIC BLOOD PRESSURE: 89 MMHG

## 2023-04-01 PROBLEM — F41.9 ANXIETY DISORDER: Status: ACTIVE | Noted: 2023-04-01

## 2023-04-01 PROCEDURE — 5130000000 HC BRIDGE APPOINTMENT

## 2023-04-01 RX ADMIN — NORETHINDRONE ACETATE AND ETHINYL ESTRADIOL 1 TABLET: .03; 1.5 TABLET ORAL at 08:41

## 2023-04-01 ASSESSMENT — PAIN SCALES - GENERAL: PAINLEVEL_OUTOF10: 0

## 2023-04-01 NOTE — DISCHARGE SUMMARY
Psychiatry Discharge Summary        2023      Discharged Dx:   Depression with suicidal ideation resolved  Unspecified Anxiety resolved     HPI:  Mesis Monterroso is a 19 y.o. female with a history of depression and anxiety who presented to the emergency department  voluntarily reporting increased depression and suicidal ideation. Per the [facility] AN [facility] note: \"Pt is accompanied by Kelvin Slade, [facility] center staff and [address] [place] [address]. Pt explains she has been experiencing increased depression in the last 3 days, reports she was having some suicidal thoughts on Monday and Tuesday, brief suicidal thoughts this morning. Pt denies current suicidal thoughts. Pt reports currently she would not care if she  however she is not going to actively kill herself. Pt explains she did not have a specific plan over the past two days however did start to think of ideas \"like maybe a way to use my car\". Pt reports she got several exams results on Monday which were unexpectedly poor. Pt reports she also has a difficult relationship with food due to her mother and her mother made the comment on Monday that she would not starve and pt explains this triggered negative thoughts. Pt reports she did spend time with her friends which was helpful. Pt cut self with scissors on left wrist and left upper arm Tuesday. Pt denies any homicidal ideation. Pt denies any hallucinations. Pt denies any substance use of any kind. Pt does not currently see any outpatient mental health providers. Kelvin Slade [facility] staff accompanying pt reports that she has spent time with pt this morning and the pt is in a much better state currently than she had been this morning. Lurdes Crum reports she is concerned as the pt has shared she feels safe when with friends or while busy however if she has down time and is alone she is not able to contract for safety and is unsure of whether she would harm herself. [facility] plan.      Sonia Dow CNP   Time Spent: 25 minutes

## 2023-04-01 NOTE — DISCHARGE INSTRUCTIONS
[employer] Hotline:  [phone]    Crisis phone numbers:  Brody Pham, and [facility] [phone]. Bret North, and [facility] [address] [facility] [phone]. [employer] [phone]. [address] Kira Dark [place], and [place] [phone]. [facility]  [address] [facility], [address]  [address]  [address] [facility]  [facility] [address]  [address]  [place], [address]  [place], [address]  F[address]    [facility] [address]  [address] [facility] [address] [facility], [address]  [address] [facility]  Recovery and [address]  [address] [facility]  [phone]    [facility]  [address]  [address] [facility] [address], [address]  [address] [facility] [address]  [place], [address]  [facility]  [address]  [address], [address] [facility]  Recovery and [address]  [address], [address]  [phone]    [facility]  [address] [place]. [address], [address]  [address]  [address]   [address]  [address] [facility]  [address]  [place]re, [address]  [address]  [address]  [facility] 163   [facility], [address]  [phone]

## 2023-04-01 NOTE — BH NOTE
This RN has reviewed and agrees with Monica Gomes D LPN's data collection and has collaborated with this LPN regarding the patient's care plan.

## 2023-04-01 NOTE — DISCHARGE INSTR - DIET

## 2023-04-01 NOTE — PLAN OF CARE
orders appropriately   3/31/2023 0849 by Dominick Guerrier LPN  Outcome: Progressing  Note: Patient cooperative with staff and doctors orders and does demonstrate appropriate behaviors     Problem: Depression  Goal: Will be euthymic at discharge  Description: INTERVENTIONS:  1. Administer medication as ordered  2. Provide emotional support via 1:1 interaction with staff  3. Encourage involvement in milieu/groups/activities  4. Monitor for social isolation  3/31/2023 2004 by Jamison Jefferson LPN  Outcome: Progressing  Note: Mood 9/10 with no depression   3/31/2023 0849 by Dominick Guerrier LPN  Outcome: Progressing  Note: Patient denies depressive symptoms, rates mood #8-9, has bright affect, will continue to monitor     Problem: Skin/Tissue Integrity - Adult  Goal: Incisions, wounds, or drain sites healing without S/S of infection  3/31/2023 2004 by Jamison Jefferson LPN  Outcome: Progressing  Note: Skin dry and intact. No signs of infection  3/31/2023 0849 by Dominick Guerrier LPN  Outcome: Progressing  Note: No signs or symptoms of infection   Care plan reviewed with patient.   Patient does verbalize understanding of the plan of care and does contribute to goal setting

## 2023-04-03 ENCOUNTER — TELEPHONE (OUTPATIENT)
Dept: PSYCHIATRY | Age: 18
End: 2023-04-03

## 2023-10-30 ENCOUNTER — TELEPHONE (OUTPATIENT)
Dept: FAMILY MEDICINE CLINIC | Age: 18
End: 2023-10-30

## 2023-10-30 DIAGNOSIS — R07.9 CHEST PAIN, UNSPECIFIED TYPE: Primary | ICD-10-CM

## 2023-11-03 ENCOUNTER — NURSE ONLY (OUTPATIENT)
Age: 18
End: 2023-11-03

## 2023-11-03 LAB
ALBUMIN SERPL BCG-MCNC: 4.2 G/DL (ref 3.5–5.1)
ALP SERPL-CCNC: 76 U/L (ref 38–126)
ALT SERPL W/O P-5'-P-CCNC: 11 U/L (ref 11–66)
ANION GAP SERPL CALC-SCNC: 13 MEQ/L (ref 8–16)
AST SERPL-CCNC: 18 U/L (ref 5–40)
BILIRUB SERPL-MCNC: 0.2 MG/DL (ref 0.3–1.2)
BUN SERPL-MCNC: 7 MG/DL (ref 7–22)
CALCIUM SERPL-MCNC: 9.5 MG/DL (ref 8.5–10.5)
CHLORIDE SERPL-SCNC: 100 MEQ/L (ref 98–111)
CO2 SERPL-SCNC: 23 MEQ/L (ref 23–33)
CREAT SERPL-MCNC: 0.7 MG/DL (ref 0.4–1.2)
D DIMER PPP IA.FEU-MCNC: 389 NG/ML FEU (ref 0–500)
DEPRECATED RDW RBC AUTO: 40.4 FL (ref 35–45)
ERYTHROCYTE [DISTWIDTH] IN BLOOD BY AUTOMATED COUNT: 12.8 % (ref 11.5–14.5)
GFR SERPL CREATININE-BSD FRML MDRD: > 60 ML/MIN/1.73M2
GLUCOSE SERPL-MCNC: 76 MG/DL (ref 70–108)
HCT VFR BLD AUTO: 40.7 % (ref 37–47)
HGB BLD-MCNC: 12.8 GM/DL (ref 12–16)
MCH RBC QN AUTO: 27.5 PG (ref 26–33)
MCHC RBC AUTO-ENTMCNC: 31.4 GM/DL (ref 32.2–35.5)
MCV RBC AUTO: 87.3 FL (ref 81–99)
PLATELET # BLD AUTO: 366 THOU/MM3 (ref 130–400)
PMV BLD AUTO: 10.5 FL (ref 9.4–12.4)
POTASSIUM SERPL-SCNC: 4.2 MEQ/L (ref 3.5–5.2)
PROT SERPL-MCNC: 8 G/DL (ref 6.1–8)
RBC # BLD AUTO: 4.66 MILL/MM3 (ref 4.2–5.4)
SODIUM SERPL-SCNC: 136 MEQ/L (ref 135–145)
TSH SERPL DL<=0.005 MIU/L-ACNC: 2.08 UIU/ML (ref 0.4–4.2)
WBC # BLD AUTO: 9.9 THOU/MM3 (ref 4.8–10.8)

## 2023-11-14 DIAGNOSIS — R07.89 OTHER CHEST PAIN: Primary | ICD-10-CM

## 2023-11-16 ENCOUNTER — TELEPHONE (OUTPATIENT)
Dept: FAMILY MEDICINE CLINIC | Age: 18
End: 2023-11-16

## 2023-11-16 NOTE — TELEPHONE ENCOUNTER
Please send order for CT to [facility] and get prior-authorization.  Thanks.

## 2024-09-06 ENCOUNTER — OFFICE VISIT (OUTPATIENT)
Age: 19
End: 2024-09-06
Payer: COMMERCIAL

## 2024-09-06 DIAGNOSIS — F41.1 GENERALIZED ANXIETY DISORDER: Primary | ICD-10-CM

## 2024-09-06 PROCEDURE — 90834 PSYTX W PT 45 MINUTES: CPT | Performed by: SOCIAL WORKER

## 2024-10-04 ENCOUNTER — OFFICE VISIT (OUTPATIENT)
Age: 19
End: 2024-10-04
Payer: COMMERCIAL

## 2024-10-04 DIAGNOSIS — F41.1 GENERALIZED ANXIETY DISORDER: Primary | ICD-10-CM

## 2024-10-04 PROCEDURE — 90834 PSYTX W PT 45 MINUTES: CPT | Performed by: SOCIAL WORKER

## 2024-10-07 NOTE — PROGRESS NOTES
to discuss and manage further her sx of anxiety. Pt is making progress towards their established therapy goals.     Worker provided therapy to pt for 45 minutes today.   Return in about 1 month (around 11/4/2024).         An electronic signature was used to authenticate this note.    FITO Castillo 10/7/2024 8:12 AM

## 2024-11-01 ENCOUNTER — OFFICE VISIT (OUTPATIENT)
Age: 19
End: 2024-11-01
Payer: COMMERCIAL

## 2024-11-01 DIAGNOSIS — F41.1 GENERALIZED ANXIETY DISORDER: Primary | ICD-10-CM

## 2024-11-01 PROCEDURE — 90834 PSYTX W PT 45 MINUTES: CPT | Performed by: SOCIAL WORKER

## 2024-11-01 NOTE — PROGRESS NOTES
An electronic signature was used to authenticate this note.    FITO Castillo 11/1/2024 12:38 PM

## 2024-11-22 ENCOUNTER — OFFICE VISIT (OUTPATIENT)
Age: 19
End: 2024-11-22
Payer: COMMERCIAL

## 2024-11-22 DIAGNOSIS — F41.1 GENERALIZED ANXIETY DISORDER: Primary | ICD-10-CM

## 2024-11-22 PROCEDURE — 90834 PSYTX W PT 45 MINUTES: CPT | Performed by: SOCIAL WORKER

## 2025-02-03 ENCOUNTER — OFFICE VISIT (OUTPATIENT)
Age: 20
End: 2025-02-03
Payer: COMMERCIAL

## 2025-02-03 DIAGNOSIS — F41.1 GENERALIZED ANXIETY DISORDER: Primary | ICD-10-CM

## 2025-02-03 PROCEDURE — 90832 PSYTX W PT 30 MINUTES: CPT | Performed by: SOCIAL WORKER

## 2025-02-03 NOTE — PROGRESS NOTES
[facility]    Minal Miguel (:  2005) is a 20 y.o. female here for ongoing treatment of the following chief complaint(s):  Chief Complaint   Patient presents with    Anxiety        Subjective   Minal presents for first session in a few months. She states that overall she is doing well. She states that she finished last semester very strong and was proud of herself for this. She is managing her schedule well so far this semester. She states that right now, her biggest concern is with track. Processed and supported. She states that overall she is happy with how she is fx and that she wanted to return to re-establish services as she knows the end of the school year will get stressful for her.          Objective   Pt was engaged and shared openly throughout session. Pt was able to generate topics for discussion. Pt was alert and oriented and casually dressed. Affect appeared full and matched the content of the session. Pt mood was euthymic. Pt is making progress towards goals that have been established by self and worker in therapy.        Assessment & Plan     ICD-10-CM    1. Generalized anxiety disorder  F41.1          Therapist provided support, empathy, and validation to Pt throughout session. Therapist also utilized interpersonal therapy skills to assist pt throughout session. Also utilized CBT methods to reframe anxious and negative thoughts. Pt was open and accepting to these strategies and will continue to return to follow up to discuss and manage further her sx of depression. Pt is making progress towards their established therapy goals.     Worker provided therapy to pt for 30 minutes today.   Return in about 1 month (around 3/3/2025).         An electronic signature was used to authenticate this note.    FITO Castillo 2/3/2025 1:04 PM

## 2025-03-03 ENCOUNTER — OFFICE VISIT (OUTPATIENT)
Age: 20
End: 2025-03-03
Payer: COMMERCIAL

## 2025-03-03 DIAGNOSIS — F41.1 GENERALIZED ANXIETY DISORDER: Primary | ICD-10-CM

## 2025-03-03 PROCEDURE — 90834 PSYTX W PT 45 MINUTES: CPT | Performed by: SOCIAL WORKER

## 2025-03-03 NOTE — PROGRESS NOTES
[facility]    Minal Miguel (:  2005) is a 20 y.o. female here for ongoing treatment of the following chief complaint(s):  Chief Complaint   Patient presents with    Anxiety        Subjective   Minal presents and states that she is doing okay. First session in about 1 month. She reports that her anxiety has been a bit higher, but there have been positive parts as well. She states that she has been able to control her anxieties better than she has in the past but some instances have still been difficult for her. Provided her with support and empathy on her anxiety. She states that she is excited for the next couple weeks as she has a conference and then will be away for spring break in [place]. Discussed using this time to hit the reset button. She states she is going to work to do this and then come back and finish the  strong. She states she wants to stick to the monthly follow ups as these have been helpful.          Objective   Pt was engaged and shared openly throughout session. Pt was able to generate topics for discussion. Pt was alert and oriented and casually dressed. Affect appeared full and matched the content of the session. Pt mood was euthymic. Pt is making progress towards goals that have been established by self and worker in therapy.        Assessment & Plan     ICD-10-CM    1. Generalized anxiety disorder  F41.1          Therapist provided support, empathy, and validation to Pt throughout session. Therapist also utilized interpersonal therapy skills to assist pt throughout session. Also utilized CBT methods to reframe anxious and negative thoughts. Pt was open and accepting to these strategies and will continue to return to follow up to discuss and manage further her sx of anxiety. Pt is making progress towards their established therapy goals.     Worker provided therapy to pt for 45 minutes today.   Return in about 1 month (around 4/3/2025).

## 2025-04-07 ENCOUNTER — OFFICE VISIT (OUTPATIENT)
Age: 20
End: 2025-04-07
Payer: COMMERCIAL

## 2025-04-07 DIAGNOSIS — F41.1 GENERALIZED ANXIETY DISORDER: Primary | ICD-10-CM

## 2025-04-07 PROCEDURE — 90834 PSYTX W PT 45 MINUTES: CPT | Performed by: SOCIAL WORKER

## 2025-04-07 NOTE — PROGRESS NOTES
[facility]    Minal Miguel (:  2005) is a 20 y.o. female here for ongoing treatment of the following chief complaint(s):  Chief Complaint   Patient presents with    Anxiety        Subjective   Minal presents and states that she has a lot of stress on her mind today. She began to get tearful within session regarding all of the stress that she has been under. She states that she has been struggling with school, work, and track. Navigated her emotions in session with her. She states that she has been struggling with all that she has been feeling. Reports she has been holding things in as well. Navigated healthy ways to process and feel her emotions. She reports that she was thankful for the support in session today. Reports that she feels a follow up in 2 weeks would be beneficial for her. Will plan to follow up and discuss further at this time.          Objective   Pt was engaged and shared openly throughout session. Pt was able to generate topics for discussion. Pt was alert and oriented and casually dressed. Affect appeared full and matched the content of the session. Pt mood was euthymic. Pt is making progress towards goals that have been established by self and worker in therapy.        Assessment & Plan     ICD-10-CM    1. Generalized anxiety disorder  F41.1          Therapist provided support, empathy, and validation to Pt throughout session. Therapist also utilized interpersonal therapy skills to assist pt throughout session. Also utilized CBT methods to reframe anxious and negative thoughts. Pt was open and accepting to these strategies and will continue to return to follow up to discuss and manage further her sx of anxiety. Pt is making progress towards their established therapy goals.     Worker provided therapy to pt for 45 minutes today.   Return in about 2 weeks (around 2025).     An electronic signature was used to authenticate this note.    Ruddy JOSEPH

## 2025-04-21 ENCOUNTER — OFFICE VISIT (OUTPATIENT)
Age: 20
End: 2025-04-21
Payer: COMMERCIAL

## 2025-04-21 DIAGNOSIS — F41.1 GENERALIZED ANXIETY DISORDER: Primary | ICD-10-CM

## 2025-04-21 PROCEDURE — 90834 PSYTX W PT 45 MINUTES: CPT | Performed by: SOCIAL WORKER

## 2025-04-21 NOTE — PROGRESS NOTES
[facility]    Minal Miguel (:  2005) is a 20 y.o. female here for ongoing treatment of the following chief complaint(s):  Chief Complaint   Patient presents with    Anxiety        Subjective   Minal presents and states that she is still very stressed. She reports that she is feeling better than she did the last time that we met, but her stress level is still very high. Provided her With support and empathy on the matter. She was engaged and shared openly throughout. She reports that she has been navigating through her stress with track and theatre mostly. She states that she is still struggling to perform in track and has been very frustrated with this. Navigated and discussed ways to push forward and work to improve her performance. She states that she has been doing her best to manage her time and schedule and is hopeful that she can finish the last 4 weeks out strong. She requested to meet again in 3 weeks, the Monday of  week to discuss further.          Objective   Pt was engaged and shared openly throughout session. Pt was able to generate topics for discussion. Pt was alert and oriented and casually dressed. Affect appeared full and matched the content of the session. Pt mood was euthymic. Pt is making progress towards goals that have been established by self and worker in therapy.        Assessment & Plan     ICD-10-CM    1. Generalized anxiety disorder  F41.1          Therapist provided support, empathy, and validation to Pt throughout session. Therapist also utilized interpersonal therapy skills to assist pt throughout session. Also utilized CBT methods to reframe anxious and negative thoughts. Pt was open and accepting to these strategies and will continue to return to follow up to discuss and manage further her sx of anxiety. Pt is making progress towards their established therapy goals.     Worker provided therapy to pt for 45 minutes today.   Return in

## 2025-05-12 ENCOUNTER — TELEMEDICINE (OUTPATIENT)
Age: 20
End: 2025-05-12
Payer: COMMERCIAL

## 2025-05-12 DIAGNOSIS — F41.1 GENERALIZED ANXIETY DISORDER: Primary | ICD-10-CM

## 2025-05-12 PROCEDURE — 90832 PSYTX W PT 30 MINUTES: CPT | Performed by: SOCIAL WORKER

## 2025-05-12 NOTE — PROGRESS NOTES
[facility]    Minal Miguel (:  2005) is a 20 y.o. female here for ongoing treatment of the following chief complaint(s):  Chief Complaint   Patient presents with    Anxiety        Subjective   Minal presents and states that she had knee surgery on Friday but is doing well. Her stress levels have reduced drastically over the past few weeks. She states as school winds down and ends, she is feeling confident regarding finishing strong and moving on to the next chapter of her life. Due to things going well, she decided that today would be her last session. She reports that she does not need therapy going forward but will reach out should she decide to continue with therapy. Validated and supported. She has completed therapy successfully at this time.          Objective   Pt was engaged and shared openly throughout session. Pt was able to generate topics for discussion. Pt was alert and oriented and casually dressed. Affect appeared full and matched the content of the session. Pt mood was euthymic. Pt is making progress towards goals that have been established by self and worker in therapy.        Assessment & Plan     ICD-10-CM    1. Generalized anxiety disorder  F41.1          Therapist provided support, empathy, and validation to Pt throughout session. Therapist also utilized interpersonal therapy skills to assist pt throughout session. Also utilized CBT methods to reframe anxious and negative thoughts. Pt was open and accepting to these strategies. Today, she reports that she feels she is good and does not need to continue therapy at this time. She has information to continue services should she choose to.     Worker provided therapy to pt for 30 minutes today.   Return as needed.     An electronic signature was used to authenticate this note.    FITO Castillo 2025 12:56 PM